# Patient Record
Sex: FEMALE | Race: BLACK OR AFRICAN AMERICAN | NOT HISPANIC OR LATINO | Employment: UNEMPLOYED | URBAN - METROPOLITAN AREA
[De-identification: names, ages, dates, MRNs, and addresses within clinical notes are randomized per-mention and may not be internally consistent; named-entity substitution may affect disease eponyms.]

---

## 2018-10-18 ENCOUNTER — HOSPITAL ENCOUNTER (EMERGENCY)
Facility: HOSPITAL | Age: 23
Discharge: HOME | End: 2018-10-18
Attending: EMERGENCY MEDICINE | Admitting: EMERGENCY MEDICINE
Payer: COMMERCIAL

## 2018-10-18 ENCOUNTER — APPOINTMENT (EMERGENCY)
Dept: ULTRASOUND IMAGING | Facility: HOSPITAL | Age: 23
End: 2018-10-18
Payer: COMMERCIAL

## 2018-10-18 VITALS
SYSTOLIC BLOOD PRESSURE: 135 MMHG | TEMPERATURE: 98.4 F | OXYGEN SATURATION: 98 % | HEART RATE: 94 BPM | WEIGHT: 198 LBS | RESPIRATION RATE: 20 BRPM | DIASTOLIC BLOOD PRESSURE: 75 MMHG

## 2018-10-18 DIAGNOSIS — O20.0 THREATENED MISCARRIAGE: ICD-10-CM

## 2018-10-18 DIAGNOSIS — Z34.90 INTRAUTERINE PREGNANCY: Primary | ICD-10-CM

## 2018-10-18 LAB
ABO GROUP BLD: NORMAL
B-HCG SERPL-ACNC: ABNORMAL MIU/ML
BASOPHILS # BLD AUTO: 0.04 THOUSANDS/ΜL (ref 0–0.1)
BASOPHILS NFR BLD AUTO: 0 % (ref 0–1)
BILIRUB UR QL STRIP: NEGATIVE
BLD GP AB SCN SERPL QL: NEGATIVE
CLARITY UR: NORMAL
COLOR UR: YELLOW
COLOR, POC: YELLOW
EOSINOPHIL # BLD AUTO: 0.16 THOUSAND/ΜL (ref 0–0.61)
EOSINOPHIL NFR BLD AUTO: 2 % (ref 0–6)
ERYTHROCYTE [DISTWIDTH] IN BLOOD BY AUTOMATED COUNT: 14.1 % (ref 11.6–15.1)
EXT PREG TEST URINE: POSITIVE
GLUCOSE UR STRIP-MCNC: NEGATIVE MG/DL
HCT VFR BLD AUTO: 40.8 % (ref 34.8–46.1)
HGB BLD-MCNC: 13.4 G/DL (ref 11.5–15.4)
HGB UR QL STRIP.AUTO: NEGATIVE
IMM GRANULOCYTES # BLD AUTO: 0.05 THOUSAND/UL (ref 0–0.2)
IMM GRANULOCYTES NFR BLD AUTO: 1 % (ref 0–2)
KETONES UR STRIP-MCNC: NEGATIVE MG/DL
LEUKOCYTE ESTERASE UR QL STRIP: NEGATIVE
LYMPHOCYTES # BLD AUTO: 2.87 THOUSANDS/ΜL (ref 0.6–4.47)
LYMPHOCYTES NFR BLD AUTO: 28 % (ref 14–44)
MCH RBC QN AUTO: 29.6 PG (ref 26.8–34.3)
MCHC RBC AUTO-ENTMCNC: 32.8 G/DL (ref 31.4–37.4)
MCV RBC AUTO: 90 FL (ref 82–98)
MONOCYTES # BLD AUTO: 0.9 THOUSAND/ΜL (ref 0.17–1.22)
MONOCYTES NFR BLD AUTO: 9 % (ref 4–12)
NEUTROPHILS # BLD AUTO: 6.1 THOUSANDS/ΜL (ref 1.85–7.62)
NEUTS SEG NFR BLD AUTO: 60 % (ref 43–75)
NITRITE UR QL STRIP: NEGATIVE
NRBC BLD AUTO-RTO: 0 /100 WBCS
PH UR STRIP.AUTO: 6 [PH] (ref 4.5–8)
PLATELET # BLD AUTO: 318 THOUSANDS/UL (ref 149–390)
PMV BLD AUTO: 8.9 FL (ref 8.9–12.7)
PROT UR STRIP-MCNC: NEGATIVE MG/DL
RBC # BLD AUTO: 4.52 MILLION/UL (ref 3.81–5.12)
RH BLD: POSITIVE
SP GR UR STRIP.AUTO: 1.02 (ref 1–1.03)
SPECIMEN EXPIRATION DATE: NORMAL
UROBILINOGEN UR QL STRIP.AUTO: 0.2 E.U./DL
WBC # BLD AUTO: 10.12 THOUSAND/UL (ref 4.31–10.16)

## 2018-10-18 PROCEDURE — 81025 URINE PREGNANCY TEST: CPT | Performed by: EMERGENCY MEDICINE

## 2018-10-18 PROCEDURE — 86850 RBC ANTIBODY SCREEN: CPT | Performed by: EMERGENCY MEDICINE

## 2018-10-18 PROCEDURE — 86901 BLOOD TYPING SEROLOGIC RH(D): CPT | Performed by: EMERGENCY MEDICINE

## 2018-10-18 PROCEDURE — 84702 CHORIONIC GONADOTROPIN TEST: CPT | Performed by: EMERGENCY MEDICINE

## 2018-10-18 PROCEDURE — 76801 OB US < 14 WKS SINGLE FETUS: CPT

## 2018-10-18 PROCEDURE — 81003 URINALYSIS AUTO W/O SCOPE: CPT

## 2018-10-18 PROCEDURE — 99284 EMERGENCY DEPT VISIT MOD MDM: CPT

## 2018-10-18 PROCEDURE — 85025 COMPLETE CBC W/AUTO DIFF WBC: CPT | Performed by: EMERGENCY MEDICINE

## 2018-10-18 PROCEDURE — 86900 BLOOD TYPING SEROLOGIC ABO: CPT | Performed by: EMERGENCY MEDICINE

## 2018-10-18 PROCEDURE — 87086 URINE CULTURE/COLONY COUNT: CPT

## 2018-10-18 PROCEDURE — 36415 COLL VENOUS BLD VENIPUNCTURE: CPT | Performed by: EMERGENCY MEDICINE

## 2018-10-18 NOTE — ED PROVIDER NOTES
History  Chief Complaint   Patient presents with    Vaginal Bleeding - Pregnant     Reporting patient has been having abd cramping and vaginal spotting for the past two days intermittently  6 weeks pregnant   LMP 18 Spotting the past 2 days  Spotting stopped today  Also having lower abd cramps  OBGYN: [facility]        History provided by:  Patient   used: No    Vaginal Bleeding   Quality:  Spotting  Duration:  2 days  Timing:  Intermittent  Progression:  Resolved  Chronicity:  New  Possible pregnancy: yes    Relieved by:  Nothing  Worsened by:  Nothing  Ineffective treatments:  None tried  Associated symptoms: abdominal pain (Lower abd) and nausea    Associated symptoms: no back pain, no dysuria, no fever and no vaginal discharge    Risk factors: no hx of ectopic pregnancy, no prior miscarriage and no terminated pregnancies        None       History reviewed  No pertinent past medical history  Past Surgical History:   Procedure Laterality Date     SECTION         History reviewed  No pertinent family history  I have reviewed and agree with the history as documented  Social History   Substance Use Topics    Smoking status: Never Smoker    Smokeless tobacco: Never Used    Alcohol use No        Review of Systems   Constitutional: Negative for appetite change, chills and fever  HENT: Negative for congestion and rhinorrhea  Respiratory: Negative for cough  Gastrointestinal: Positive for abdominal pain (Lower abd) and nausea  Negative for abdominal distention, blood in stool, constipation, diarrhea and vomiting  Genitourinary: Positive for vaginal bleeding  Negative for dysuria, flank pain, frequency, hematuria, urgency and vaginal discharge  Musculoskeletal: Negative for back pain  All other systems reviewed and are negative  Physical Exam  Physical Exam   Constitutional: She is oriented to person, place, and time   She appears well-developed and well-nourished  No distress  HENT:   Head: Normocephalic  Mouth/Throat: Oropharynx is clear and moist and mucous membranes are normal    Neck: Normal range of motion  Neck supple  Cardiovascular: Normal rate, regular rhythm, normal heart sounds and intact distal pulses  Exam reveals no gallop and no friction rub  No murmur heard  Pulmonary/Chest: Effort normal and breath sounds normal  No respiratory distress  She has no wheezes  She has no rales  Abdominal: Soft  Normal appearance and bowel sounds are normal  She exhibits no distension and no mass  There is no hepatosplenomegaly  There is no tenderness  There is no rigidity, no rebound, no guarding, no CVA tenderness, no tenderness at McBurney's point and negative Mix's sign  Lymphadenopathy:     She has no cervical adenopathy  Neurological: She is alert and oriented to person, place, and time  Skin: Skin is warm, dry and intact  No rash noted  No pallor  Nursing note and vitals reviewed        Vital Signs  ED Triage Vitals [10/18/18 1903]   Temperature Pulse Respirations Blood Pressure SpO2   98 4 °F (36 9 °C) 94 20 135/75 98 %      Temp Source Heart Rate Source Patient Position - Orthostatic VS BP Location FiO2 (%)   Oral Monitor Sitting Right arm --      Pain Score       1           Vitals:    10/18/18 1903   BP: 135/75   Pulse: 94   Patient Position - Orthostatic VS: Sitting       Visual Acuity      ED Medications  Medications - No data to display    Diagnostic Studies  Results Reviewed     Procedure Component Value Units Date/Time    Quantitative hCG [[id number]]  (Abnormal) Collected:  10/18/18 1930    Lab Status:  Final result Specimen:  Blood from Arm, Right Updated:  10/18/18 2013     HCG, Quant 28,151 8 (H) mIU/mL     Narrative:          Expected Ranges:     Approximate               Approximate HCG  Gestation age          Concentration ( mIU/mL)  _____________          ______________________   Mark Talley                      HCG values  0 2-1                       5-50  1-2                           2-3                         100-5000  3-4                         500-50657  4-5                         1000-72627  5-6                         [id number]  6-8                         [id number]  8-12                        [id number]    CBC and differential [[id number]] Collected:  10/18/18 1929    Lab Status:  Final result Specimen:  Blood from Arm, Right Updated:  10/18/18 1937     WBC 10 12 Thousand/uL      RBC 4 52 Million/uL      Hemoglobin 13 4 g/dL      Hematocrit 40 8 %      MCV 90 fL      MCH 29 6 pg      MCHC 32 8 g/dL      RDW 14 1 %      MPV 8 9 fL      Platelets 425 Thousands/uL      nRBC 0 /100 WBCs      Neutrophils Relative 60 %      Immat GRANS % 1 %      Lymphocytes Relative 28 %      Monocytes Relative 9 %      Eosinophils Relative 2 %      Basophils Relative 0 %      Neutrophils Absolute 6 10 Thousands/µL      Immature Grans Absolute 0 05 Thousand/uL      Lymphocytes Absolute 2 87 Thousands/µL      Monocytes Absolute 0 90 Thousand/µL      Eosinophils Absolute 0 16 Thousand/µL      Basophils Absolute 0 04 Thousands/µL     Urine culture [[id number]] Collected:  10/18/18 1933    Lab Status:   In process Specimen:  Urine from Urine, Clean Catch Updated:  10/18/18 1928    POCT pregnancy, urine [[id number]]  (Abnormal) Resulted:  10/18/18 1923    Lab Status:  Final result Updated:  10/18/18 1923     EXT PREG TEST UR (Ref: Negative) Positive    POCT urinalysis dipstick [[id number]]  (Normal) Resulted:  10/18/18 1921    Lab Status:  Final result Specimen:  Urine from Urine, Other Updated:  10/18/18 1923     Color, UA yellow    ED Urine Macroscopic [[id number]] Collected:  10/18/18 1933    Lab Status:  Final result Specimen:  Urine Updated:  10/18/18 1921     Color, UA Yellow     Clarity, UA Cloudy     pH, UA 6 0     Leukocytes, UA Negative     Nitrite, UA Negative     Protein, UA Negative mg/dl      Glucose, UA Negative mg/dl Ketones, UA Negative mg/dl      Urobilinogen, UA 0 2 E U /dl      Bilirubin, UA Negative     Blood, UA Negative     Specific Gravity, UA 1 025    Narrative:       CLINITEK RESULT                 US OB < 14 weeks with transvaginal   Final Result by Percy May DO (10/18 2107)      Single live intrauterine gestation at 6 weeks 1 day (range +/- 0 weeks 4 days)  OSCAR of 6/12/2019  Follow-up ultrasound at 20 weeks gestation for assessment of anatomy may be considered  Workstation performed: [id number]                    Procedures  Procedures       Phone Contacts  ED Phone Contact    ED Course  ED Course as of Oct 18 2119   Thu Oct 18, 2018   2118 Pt updated on results  Instructed to f/u with OBGYN                                 MDM  Number of Diagnoses or Management Options     Amount and/or Complexity of Data Reviewed  Clinical lab tests: reviewed and ordered  Tests in the radiology section of CPT®: ordered and reviewed  Tests in the medicine section of CPT®: ordered and reviewed      CritCare Time    Disposition  Final diagnoses:   Intrauterine pregnancy   Threatened miscarriage     Time reflects when diagnosis was documented in both MDM as applicable and the Disposition within this note     Time User Action Codes Description Comment    10/18/2018  9:19 PM Maribel Leak [Z34 90] Intrauterine pregnancy     10/18/2018  9:19 PM Dean Guerra 50 [O20 0] Threatened miscarriage       ED Disposition     ED Disposition Condition Comment    Discharge  Gem Dai discharge to home/self care  Condition at discharge: Good        Follow-up Information     Follow up With Specialties Details Why Contact Info    Your OBGYN  Schedule an appointment as soon as possible for a visit            Patient's Medications    No medications on file     No discharge procedures on file      ED Provider  Electronically Signed by                  Julina Andrew DO  10/18/18 2119

## 2018-10-19 LAB — BACTERIA UR CULT: NORMAL

## 2018-10-19 NOTE — DISCHARGE INSTRUCTIONS
Threatened Miscarriage   WHAT YOU NEED TO KNOW:   A threatened miscarriage occurs when you have vaginal bleeding within the first 20 weeks of pregnancy  It means that a miscarriage may happen  A threatened miscarriage may also be called a threatened   DISCHARGE INSTRUCTIONS:   Return to the emergency department if:   · You feel weak or faint  · Your pain or cramping in your abdomen or back gets worse  · You have vaginal bleeding that soaks 1 or more pads in an hour  · You pass material that looks like tissue or large clots  Contact your healthcare provider or obstetrician if:   · You have a fever  · You have trouble urinating, burning when you urinate, or feel a need to urinate often  · You have new or worsening vaginal bleeding  · You have vaginal pain or itching, or vaginal discharge that is yellow, green, or foul-smelling  · You have questions or concerns about your condition or care  Self-care: The following may help you manage your symptoms and decrease your risk for a miscarriage:  · Do not put anything in your vagina  Do not have sex, douche, or use tampons  These actions may increase your risk for infection and miscarriage  · Rest as directed  Do not exercise or do strenuous activities  These activities may cause  labor or miscarriage  Ask your healthcare provider what activities are okay to do  Stay healthy during pregnancy:   · Eat a variety of healthy foods  Healthy foods can help you get extra protein, water, and calories that you need while you are pregnant  Healthy foods include fruits, vegetables, whole-grain breads, low-fat dairy products, beans, lean meats, and fish  Avoid raw or undercooked meat and fish  Ask your healthcare provider if you need a special diet  · Take prenatal vitamins as directed  These help you get the right amount of vitamins and minerals  They may also decrease the risk of certain birth defects      · Do not drink alcohol or use illegal drugs  These can increase your risk for a miscarriage or harm your baby  · Do not smoke  Nicotine and other chemicals in cigarettes and cigars can harm your baby and cause miscarriage or  labor  Ask your healthcare provider for information if you currently smoke and need help to quit  E-cigarettes or smokeless tobacco still contain nicotine  Do not use these products  · Decrease your risk for an infection  Always wash your hands before eating or preparing meals  Do not spend time with people who are sick  Ask your healthcare provider if you need immunizations such as the flu or hepatitis B vaccine  Immunizations may decrease your risk for infections that could cause a miscarriage  · Manage your medical conditions  Keep your blood pressure and blood sugars under control  Maintain a healthy weight during pregnancy  Follow up with your obstetrician as directed: You may need to see your obstetrician frequently for ultrasounds or blood tests  Write down your questions so you remember to ask them during your visits  ©  26[employer] Information is for End User's use only and may not be sold, redistributed or otherwise used for commercial purposes  All illustrations and images included in CareNotes® are the copyrighted property of [employer]  or [employer]  The above information is an  only  It is not intended as medical advice for individual conditions or treatments  Talk to your doctor, nurse or pharmacist before following any medical regimen to see if it is safe and effective for you